# Patient Record
Sex: FEMALE | Race: WHITE | NOT HISPANIC OR LATINO | Employment: UNEMPLOYED | URBAN - NONMETROPOLITAN AREA
[De-identification: names, ages, dates, MRNs, and addresses within clinical notes are randomized per-mention and may not be internally consistent; named-entity substitution may affect disease eponyms.]

---

## 2018-09-28 ENCOUNTER — TRANSCRIBE ORDERS (OUTPATIENT)
Dept: PHYSICAL THERAPY | Facility: CLINIC | Age: 38
End: 2018-09-28

## 2018-09-28 ENCOUNTER — EVALUATION (OUTPATIENT)
Dept: PHYSICAL THERAPY | Facility: CLINIC | Age: 38
End: 2018-09-28
Payer: COMMERCIAL

## 2018-09-28 DIAGNOSIS — M54.2 CERVICALGIA: Primary | ICD-10-CM

## 2018-09-28 PROCEDURE — G8991 OTHER PT/OT GOAL STATUS: HCPCS | Performed by: PHYSICAL THERAPIST

## 2018-09-28 PROCEDURE — G8990 OTHER PT/OT CURRENT STATUS: HCPCS | Performed by: PHYSICAL THERAPIST

## 2018-09-28 PROCEDURE — 97014 ELECTRIC STIMULATION THERAPY: CPT | Performed by: PHYSICAL THERAPIST

## 2018-09-28 PROCEDURE — 97161 PT EVAL LOW COMPLEX 20 MIN: CPT | Performed by: PHYSICAL THERAPIST

## 2018-09-28 PROCEDURE — 97535 SELF CARE MNGMENT TRAINING: CPT | Performed by: PHYSICAL THERAPIST

## 2018-09-28 PROCEDURE — 97140 MANUAL THERAPY 1/> REGIONS: CPT | Performed by: PHYSICAL THERAPIST

## 2018-09-28 NOTE — LETTER
2018    Clyde Sun [facility]  [address] [address] [place]    Patient: Vince Escoto   YOB: 1980   Date of Visit: 2018     Encounter Diagnosis     ICD-10-CM    1  Cervicalgia M54 2        Dear Dr Megan Flores:    Please review the attached Plan of Care from Baron Gustafson Karimezahiranina's recent visit  Please verify that you agree therapy should continue by signing the attached document and sending it back to our office  If you have any questions or concerns, please don't hesitate to call  Sincerely,    Edilberto Gustafson, PT      Referring Provider:      I certify that I have read the below Plan of Care and certify the need for these services furnished under this plan of treatment while under my care  Clyde Sun MD  [address] [place] [address]  [address]: [phone]          PT Evaluation     Today's date: 2018  Patient name: Vince Barreton  : 1980  MRN: [id number]  Referring provider: Amairani Whittington MD  Dx:   Encounter Diagnosis     ICD-10-CM    1  Cervicalgia M54 2                   Assessment  Impairments: activity intolerance, impaired physical strength, lacks appropriate home exercise program, pain with function and poor posture     Assessment details: Patient demonstrates decreased strength, posture and increased pain t/o c/s and right UE limiting functional ability  Patient would benefit from PT intervention to address limitations in order to maximize functional independence  Goals  ST  Initiate HEP  2  Patient to report decreased pain by 25% at worst in 2 weeks    LT  Patient to report decreased pain at worst by 75% in 6 weeks  2  Patient to return to normal postural and shoulder strength in 6 weeks  3  Patient to be discharged to HEP in 6 weeks       Plan  Patient would benefit from: skilled physical therapy  Planned modality interventions: cryotherapy, thermotherapy: hydrocollator packs, unattended electrical stimulation and ultrasound  Planned therapy interventions: manual therapy, massage, patient education, postural training, strengthening, stretching, therapeutic activities, therapeutic exercise, therapeutic training, functional ROM exercises, flexibility, graded activity, graded exercise and home exercise program  Frequency: 3x week  Duration in visits: 12  Duration in weeks: 4  Treatment plan discussed with: patient        Subjective Evaluation    History of Present Illness  Date of onset: 2018  Mechanism of injury: Patient presents to PT with c/o pain in right scapular region and into right UE  She reports 2 weeks ago she woke up and felt like "someone punched me in my shoulder blade"  Patient left it go for a few days but the pain did not go away  She then went to see her PCP and was given flexeril  Patient has been using her TENS and muscle relaxers at home without improvement  Patient reports she was diagnosed with bulging discs in her cervical spine 8 years ago  Patient reports the symptoms have traveled further into her right upper arm to her elbow  Patient reports she has difficulty sleeping and with cervical extension  Patient describes her symptoms as throbbing and pinching  She returns to MD in 3 weeks and is now referred to oppt  Pain  At best pain rating: 3  At worst pain ratin  Relieving factors: ice, heat and medications  Progression: worsening    Patient Goals  Patient goals for therapy: decreased pain  Patient goal: To sleep        Objective     Static Posture     Head  Forward  Shoulders  Rounded      Palpation     Additional Palpation Details  No ttp or muscle spasm noted t/o upper back and c/s    Neurological Testing     Sensation   Cervical/Thoracic   Left   Intact: light touch    Right   Intact: light touch    Active Range of Motion   Cervical/Thoracic Spine   Cervical    Flexion: WFL and with pain  Extension: WFL and with pain  Left lateral flexion: WFL  Right lateral flexion: WFL and with pain  Left rotation: WFL  Right rotation: WFL and with pain    Strength/Myotome Testing     Left Shoulder     Planes of Motion   Flexion: 4+   Abduction: 4+   External rotation at 0°:  4+   Internal rotation at 0°:  4+     Right Shoulder     Planes of Motion   Flexion: 4+   Abduction: 4+   External rotation at 0°:  4+   Internal rotation at 0°:  4+     Tests   Cervical     Left   Positive Spurling's sign  Right   Positive cervical distraction and Spurling's sign  Additional Tests Details  Spurling's test positive with Extension and lateral flexion to both sides and flexion with lateral flexion/rotation to right   Patient with centralization of symptoms with manual distraction          Precautions: None    Daily Treatment Diary     Manual  9/28            Cervical Traction 10 min                                                                    Exercise Diary  9/28            UBE Retro             Shrugs             Retractions             MTP/LTP             Chin Tucks             Supine Cane Flexion                                                                                                                                                                                                       Modalities  9/28            IFC with MHP 15 min

## 2018-09-28 NOTE — PROGRESS NOTES
PT Evaluation     Today's date: 2018  Patient name: Brayden Bliss  : 1980  MRN: [id number]  Referring provider: Arsalan Esteves MD  Dx:   Encounter Diagnosis     ICD-10-CM    1  Cervicalgia M54 2                   Assessment  Impairments: activity intolerance, impaired physical strength, lacks appropriate home exercise program, pain with function and poor posture     Assessment details: Patient demonstrates decreased strength, posture and increased pain t/o c/s and right UE limiting functional ability  Patient would benefit from PT intervention to address limitations in order to maximize functional independence  Goals  ST  Initiate HEP  2  Patient to report decreased pain by 25% at worst in 2 weeks    LT  Patient to report decreased pain at worst by 75% in 6 weeks  2  Patient to return to normal postural and shoulder strength in 6 weeks  3  Patient to be discharged to HEP in 6 weeks  Plan  Patient would benefit from: skilled physical therapy  Planned modality interventions: cryotherapy, thermotherapy: hydrocollator packs, unattended electrical stimulation and ultrasound  Planned therapy interventions: manual therapy, massage, patient education, postural training, strengthening, stretching, therapeutic activities, therapeutic exercise, therapeutic training, functional ROM exercises, flexibility, graded activity, graded exercise and home exercise program  Frequency: 3x week  Duration in visits: 12  Duration in weeks: 4  Treatment plan discussed with: patient        Subjective Evaluation    History of Present Illness  Date of onset: 2018  Mechanism of injury: Patient presents to PT with c/o pain in right scapular region and into right UE  She reports 2 weeks ago she woke up and felt like "someone punched me in my shoulder blade"  Patient left it go for a few days but the pain did not go away  She then went to see her PCP and was given flexeril   Patient has been using her TENS and muscle relaxers at home without improvement  Patient reports she was diagnosed with bulging discs in her cervical spine 8 years ago  Patient reports the symptoms have traveled further into her right upper arm to her elbow  Patient reports she has difficulty sleeping and with cervical extension  Patient describes her symptoms as throbbing and pinching  She returns to MD in 3 weeks and is now referred to oppt  Pain  At best pain rating: 3  At worst pain ratin  Relieving factors: ice, heat and medications  Progression: worsening    Patient Goals  Patient goals for therapy: decreased pain  Patient goal: To sleep        Objective     Static Posture     Head  Forward  Shoulders  Rounded  Palpation     Additional Palpation Details  No ttp or muscle spasm noted t/o upper back and c/s    Neurological Testing     Sensation   Cervical/Thoracic   Left   Intact: light touch    Right   Intact: light touch    Active Range of Motion   Cervical/Thoracic Spine   Cervical    Flexion: WFL and with pain  Extension: WFL and with pain  Left lateral flexion: WFL  Right lateral flexion: WFL and with pain  Left rotation: WFL  Right rotation: WFL and with pain    Strength/Myotome Testing     Left Shoulder     Planes of Motion   Flexion: 4+   Abduction: 4+   External rotation at 0°: 4+   Internal rotation at 0°: 4+     Right Shoulder     Planes of Motion   Flexion: 4+   Abduction: 4+   External rotation at 0°: 4+   Internal rotation at 0°: 4+     Tests   Cervical     Left   Positive Spurling's sign  Right   Positive cervical distraction and Spurling's sign  Additional Tests Details  Spurling's test positive with Extension and lateral flexion to both sides and flexion with lateral flexion/rotation to right   Patient with centralization of symptoms with manual distraction          Precautions: None    Daily Treatment Diary     Manual              Cervical Traction 10 min Exercise Diary  9/28            UBE Retro             Shrugs             Retractions             MTP/LTP             Chin Tucks             Supine Cane Flexion                                                                                                                                                                                                       Modalities  9/28            IFC with MHP 15 min

## 2018-10-01 ENCOUNTER — OFFICE VISIT (OUTPATIENT)
Dept: PHYSICAL THERAPY | Facility: CLINIC | Age: 38
End: 2018-10-01
Payer: COMMERCIAL

## 2018-10-01 DIAGNOSIS — M54.2 CERVICALGIA: Primary | ICD-10-CM

## 2018-10-01 PROCEDURE — 97014 ELECTRIC STIMULATION THERAPY: CPT

## 2018-10-01 PROCEDURE — 97110 THERAPEUTIC EXERCISES: CPT

## 2018-10-01 PROCEDURE — 97140 MANUAL THERAPY 1/> REGIONS: CPT

## 2018-10-01 NOTE — PROGRESS NOTES
Daily Note     Today's date: 10/1/2018  Patient name: Tawana Lundberg  : 1980  MRN: [id number]  Referring provider: Clark Alcaraz MD  Dx:   Encounter Diagnosis     ICD-10-CM    1  Cervicalgia M54 2                   Subjective: Patient reports pain in C/S and numbness into right ue  Objective: See treatment diary below  Precautions: None     Daily Treatment Diary      Manual  9/28  10/1                   Cervical Traction 10 min  10 min                                                                                                                         Exercise Diary  9/28  10/1                   UBE Retro    5 min                   Shrugs    2# 20x                   Retractions    2# 20x                   MTP/LTP    gtb 20x                   Chin Tucks    20x                   Supine Cane Flexion    20x                                                                                                                                                                                                                                                                                                                                                                         Modalities  9/28  10/1                   IFC with MHP 15 min  15 min                                                                          Assessment: Tolerated treatment well  Patient exhibited good technique with therapeutic exercises      Plan: Continue per plan of care

## 2018-10-03 ENCOUNTER — OFFICE VISIT (OUTPATIENT)
Dept: PHYSICAL THERAPY | Facility: CLINIC | Age: 38
End: 2018-10-03
Payer: COMMERCIAL

## 2018-10-03 DIAGNOSIS — M54.2 CERVICALGIA: Primary | ICD-10-CM

## 2018-10-03 PROCEDURE — G8992 OTHER PT/OT  D/C STATUS: HCPCS | Performed by: PHYSICAL THERAPIST

## 2018-10-03 PROCEDURE — G8991 OTHER PT/OT GOAL STATUS: HCPCS | Performed by: PHYSICAL THERAPIST

## 2018-10-03 PROCEDURE — 97014 ELECTRIC STIMULATION THERAPY: CPT

## 2018-10-03 NOTE — PROGRESS NOTES
Daily Note     Today's date: 10/3/2018  Patient name: Brooklynn Muñoz  : 1980  MRN: [id number]  Referring provider: Rafi Cruz MD  Dx:   Encounter Diagnosis     ICD-10-CM    1  Cervicalgia M54 2                   Subjective: Patient reports increased symptoms since treatment on Monday  Patient reports called doctor and has an appt tomorrow  Objective: See treatment diary below  Precautions: None     Daily Treatment Diary      Manual  9/28  10/1  10/3                 Cervical Traction 10 min  10 min  held                                                                                                                       Exercise Diary  9/28  10/1  10/3                 UBE Retro    5 min  held                 Shrugs    2# 20x  held                 Retractions    2# 20x held                 MTP/LTP    gtb 20x held                 Chin Tucks    20x  held                 Supine Cane Flexion    20x  held                                                                                                                                                                                                                                                                                                                                                                       Modalities  9/28  10/1  10/3                 IFC with MHP 15 min  15 min  15 min                                                                       Assessment: Held all exercises and MT today  Applied E/S with MHP to provide pain relief  Plan: Will await physician recommendations

## 2018-10-05 ENCOUNTER — APPOINTMENT (OUTPATIENT)
Dept: PHYSICAL THERAPY | Facility: CLINIC | Age: 38
End: 2018-10-05
Payer: COMMERCIAL

## 2018-11-05 NOTE — PROGRESS NOTES
Patient seen for 3 total visits of PT  Last visit 10/3/18  Patient was returning to MD after last visit and cancelled next visit  No further contact from patient to reschedule  Patient to be discharged at this time due to script expiration